# Patient Record
Sex: FEMALE | Race: WHITE | NOT HISPANIC OR LATINO | Employment: OTHER | URBAN - METROPOLITAN AREA
[De-identification: names, ages, dates, MRNs, and addresses within clinical notes are randomized per-mention and may not be internally consistent; named-entity substitution may affect disease eponyms.]

---

## 2020-12-10 ENCOUNTER — OFFICE VISIT (OUTPATIENT)
Dept: OBSTETRICS AND GYNECOLOGY | Facility: CLINIC | Age: 35
End: 2020-12-10

## 2020-12-10 VITALS
HEIGHT: 62 IN | DIASTOLIC BLOOD PRESSURE: 98 MMHG | WEIGHT: 229 LBS | SYSTOLIC BLOOD PRESSURE: 160 MMHG | TEMPERATURE: 97.5 F | BODY MASS INDEX: 42.14 KG/M2

## 2020-12-10 DIAGNOSIS — Z00.00 ANNUAL PHYSICAL EXAM: Primary | ICD-10-CM

## 2020-12-10 DIAGNOSIS — Z80.3 FAMILY HISTORY OF BREAST CANCER: ICD-10-CM

## 2020-12-10 PROCEDURE — 99395 PREV VISIT EST AGE 18-39: CPT | Performed by: NURSE PRACTITIONER

## 2020-12-10 RX ORDER — FLUOXETINE HYDROCHLORIDE 20 MG/5ML
LIQUID ORAL DAILY
COMMUNITY
End: 2020-12-14

## 2020-12-10 RX ORDER — LEVONORGESTREL AND ETHINYL ESTRADIOL 100-20(84)
KIT ORAL
COMMUNITY
End: 2020-12-10 | Stop reason: SDUPTHER

## 2020-12-10 RX ORDER — LEVONORGESTREL AND ETHINYL ESTRADIOL 100-20(84)
1 KIT ORAL DAILY
Qty: 91 TABLET | Refills: 3 | Status: SHIPPED | OUTPATIENT
Start: 2020-12-10 | End: 2021-12-07

## 2020-12-10 NOTE — PROGRESS NOTES
GYN Annual Exam     CC - Here for annual exam.        HPI  Berenice Vitale is a 34 y.o. female, No obstetric history on file., who presents for annual well woman exam. Patient's last menstrual period was 09/26/2020 (approximate)..  Periods are regular every 90 days.  Dysmenorrhea:mild, occurring first 1-2 days of flow.  Patient reports problems with: none.  There were no changes to her medical or surgical history since her last visit.. Partner Status: Marital Status: .  New Partners since last visit: no.  Desires STD Screening: no. Her mother was diagnosed with breast cancer last year and she is interested in genetic screening.  INITIAL B/P AT OFFICE /98, DENIES HEADACHES OR VISUAL CHANGES. REPEAT B/P AT END OF VISIT 144/82.    Additional OB/GYN History   Current contraception: contraceptive methods: OCP (estrogen/progesterone)  Desires to: continue contraception  Last Pap : 9/13/2018 negative  Last Completed Pap Smear       Status Date      PAP SMEAR No completions recorded        History of abnormal Pap smear: no  Family history of uterine, colon, breast, or ovarian cancer: yes - mother bilateral breast cancer 2019 age 70  Performs monthly Self-Breast Exam: yes  Exercises Regularly:yes  Feelings of Anxiety or Depression: yes - trwated by PCP  Tobacco Usage?: No   OB History    No obstetric history on file.         Health Maintenance   Topic Date Due   • Annual Gynecologic Pelvic and Breast Exam  1986   • ANNUAL PHYSICAL  12/30/1989   • TDAP/TD VACCINES (1 - Tdap) 12/30/2005   • INFLUENZA VACCINE  08/01/2020   • HEPATITIS C SCREENING  12/10/2020   • PAP SMEAR  12/10/2020   • Pneumococcal Vaccine 0-64  Aged Out       The additional following portions of the patient's history were reviewed and updated as appropriate: allergies, current medications, past family history, past medical history, past social history, past surgical history and problem list.    Review of Systems   Constitutional: Negative.   "  Gastrointestinal: Negative.    Genitourinary: Negative.    Psychiatric/Behavioral: Negative.    All other systems reviewed and are negative.    All other systems reviewed and are negative.     I have reviewed and agree with the HPI, ROS, and historical information as entered above. Cris Barrow, APRN    Objective   /98   Temp 97.5 °F (36.4 °C)   Ht 157.5 cm (62\")   Wt 104 kg (229 lb)   LMP 09/26/2020 (Approximate)   Breastfeeding No   BMI 41.88 kg/m²     Physical Exam  Vitals signs and nursing note reviewed. Exam conducted with a chaperone present.   Constitutional:       Appearance: Normal appearance. She is obese.   Cardiovascular:      Rate and Rhythm: Normal rate and regular rhythm.   Chest:      Breasts:         Right: Normal.         Left: Normal.   Genitourinary:     General: Normal vulva.      Vagina: Normal.      Cervix: Normal.      Uterus: Normal.       Adnexa: Right adnexa normal and left adnexa normal.      Rectum: Normal.   Neurological:      Mental Status: She is alert.            Assessment and Plan    Problem List Items Addressed This Visit     None      Visit Diagnoses     Annual physical exam    -  Primary    Relevant Orders    Pap IG, Rfx HPV ASCU          1. GYN annual well woman exam.   2. Reviewed monthly self breast exams.  Instructed to call with lumps, pain, or breast discharge.    3. Reviewed exercise as a preventative health measures.   4. Reccommended Flu Vaccine in Fall of each year.  5. RTC in 1 year or PRN with problems   6. Discussed hypertension today. Pt. States checks her B/P weekly at home and is always below 140/90. She will continue to monitor and if stays 140/90 or greater will need to see PCP.   7. Patient request to continue ocps. She understands is at increased risk for stroke if has uncontrolled hypertension. She will continue to monitor B/P at home weekly if plans to remain on ocps.       Cris Barrow, DONNA  12/10/2020  "

## 2020-12-14 ENCOUNTER — TELEPHONE (OUTPATIENT)
Dept: OBSTETRICS AND GYNECOLOGY | Facility: CLINIC | Age: 35
End: 2020-12-14

## 2020-12-14 RX ORDER — FLUOXETINE HYDROCHLORIDE 20 MG/1
20 CAPSULE ORAL DAILY
Qty: 90 CAPSULE | Refills: 3 | Status: SHIPPED | OUTPATIENT
Start: 2020-12-14 | End: 2021-12-07

## 2020-12-14 NOTE — TELEPHONE ENCOUNTER
Pt needs refill on her prozac 20 mg.  Pt. States she told us on her TOS, but prozac is not listed on her medication.

## 2020-12-14 NOTE — TELEPHONE ENCOUNTER
Patient was seen for her annual exam last week. Birth control pills were sent to the pharmacy but not her antidepressant. Please send in.       Pt. Has been on prozac 20mg daily.   Rx prozac refilled.

## 2020-12-17 DIAGNOSIS — Z00.00 ANNUAL PHYSICAL EXAM: ICD-10-CM

## 2020-12-28 ENCOUNTER — TELEPHONE (OUTPATIENT)
Dept: GENETICS | Facility: HOSPITAL | Age: 35
End: 2020-12-28

## 2021-04-15 ENCOUNTER — CLINICAL SUPPORT (OUTPATIENT)
Dept: GENETICS | Facility: HOSPITAL | Age: 36
End: 2021-04-15

## 2021-04-15 ENCOUNTER — LAB (OUTPATIENT)
Dept: LAB | Facility: HOSPITAL | Age: 36
End: 2021-04-15

## 2021-04-15 DIAGNOSIS — Z80.3 FAMILY HISTORY OF BREAST CANCER: ICD-10-CM

## 2021-04-15 DIAGNOSIS — Z13.79 GENETIC TESTING: Primary | ICD-10-CM

## 2021-04-15 DIAGNOSIS — Z80.0 FAMILY HISTORY OF COLON CANCER: ICD-10-CM

## 2021-04-15 PROCEDURE — 96040: CPT | Performed by: GENETIC COUNSELOR, MS

## 2021-04-16 NOTE — PROGRESS NOTES
Bereniec Vitale, a 35-year-old female, was seen for genetic counseling due to a family history of breast cancer. Ms. Vitale has no personal history of cancer. She was 13 years old at menarche and is nulliparous. She retains her uterus and ovaries. Her current cancer screening includes annual clinical breast exam. She was interested in discussing her risk for a hereditary cancer syndrome.  Ms. Vitale was interested in pursuing a multigene panel, and therefore the CancerNext panel was ordered through [employer] which analyzes 36 genes associated with an increased cancer risk. Results from this testing are expected in approximately 2-3 weeks.    FAMILY HISTORY (see attached pedigree):   Mother:   Breast cancer, 69  Mat. 1st cousin once removed:Breast cancer, 60s  Pat. Grandmother:  Breast cancer (left), 48; Breast cancer (right), 49; Colorectal cancer, 74  Pat. Grandfather:  Mesothelioma, 72    Medical records regarding the family history were not available for review.     RISK ASSESSMENT:  Ms. Vitale’s family history of breast cancer led to concern regarding a hereditary cancer syndrome.  She meets NCCN guidelines criteria for BRCA1/2 testing based on her paternal family history of contralateral breast cancer diagnosed before age 50. If genetic testing is negative, Ms. Vitale’s management should be guided by family history. These risk assessments are based on the family history information provided at the time of the appointment.      GENETIC COUNSELING: (30 minutes) We reviewed the family history information in detail.  Cases of cancer follow three general patterns: sporadic, familial, and hereditary.  While most cancer is sporadic, some cases appear to occur in family clusters.  These cases are said to be familial and account for 10-20% of certain cancer cases.  Familial cases may be due to a combination of shared genes and environmental factors among family members.  In even fewer families, the cancer is said to be  inherited, and the genes responsible for the cancer are known.      Family histories typical of hereditary cancer syndromes usually include multiple first- and second-degree relatives diagnosed with cancer types that define a syndrome.  These cases tend to be diagnosed at younger-than-expected ages and can be bilateral or multifocal.  The cancer in these families follows an autosomal dominant inheritance pattern, which indicates the likely presence of a mutation in a cancer susceptibility gene.  Children and siblings of an individual believed to carry this mutation have a 50% chance of inheriting that mutation, thereby inheriting the increased risk to develop cancer.  These mutations can be passed down from the maternal or the paternal lineage.    Based on Ms. Vitale’s family history, we discussed that hereditary breast cancer accounts for approximately 5-10% of all cases of breast cancer.  A significant proportion of hereditary breast and ovarian cancer can be attributed to mutations in the BRCA1 and BRCA2 genes.  Mutations in these genes confer an increased risk for breast cancer, ovarian cancer, male breast cancer, prostate cancer, and pancreatic cancer. Women with a BRCA1 or BRCA2 mutation have up to an 87% lifetime risk of breast cancer and up to a 44% risk of ovarian cancer.     There are other, more rare, hereditary cancer syndromes. Some of these conditions have well defined cancer risks and established management guidelines.  Other genes that can be tested for have been more recently described, and there may be less data regarding the risks and therefore may not have established management guidelines.  We discussed these limitations at length. Based on Ms. Vitale’s family history and her desire to get more information regarding her personal risks she opted to pursue testing through a panel evaluating several other genes known to increase the risk for cancer.    GENETIC TESTING:  The risks, benefits and  limitations of genetic testing and implications for clinical management following testing were reviewed. DNA test results can influence decisions regarding screening and prevention.  Genetic testing can have significant psychological implications for both individuals and families. Also discussed was the possibility of employment and insurance discrimination based on genetic test results and the federal and states laws that are in place to prevent this.         We discussed multigene panel testing, which would involve testing BRCA1/2 and an additional 34 genes associated with an increased cancer risk. The benefits and limitations of genetic testing were discussed and Ms. Vitale decided to pursue testing of the genes via the panel. The implications of a positive or negative test result were discussed.  We also discussed the importance of testing on an affected relative.  In cases where an affected relative is not available for testing or not willing to pursue testing, it is appropriate to offer testing to an unaffected individual. We discussed the possibility that, in some cases, genetic test results may be ambiguous due to the identification of a genetic variant. These variants may or may not be associated with an increased cancer risk. With multigene panel testing, it is not uncommon for a variant of uncertain significance (VUS) to be identified.  If a VUS is identified, testing family members is not recommended and screening recommendations are made based on the family history.  The laboratories that perform genetic testing work to reclassify the VUS and send out an amended report if and when a VUS is reclassified.  The majority of variant findings are ultimately reclassified to a negative result. Given her family history, a negative test result does not eliminate all cancer risk, although the risk would not be as high as it would with positive genetic testing.     PLAN:  Genetic testing via the Nexterra panel  through [employer] was ordered and results are expected in 2-3 weeks. We will contact Ms. Vitale with her results once they are received.      Sonya Buck MS, [facility], [facility]  Licensed Certified Genetic Counselor

## 2021-05-03 ENCOUNTER — DOCUMENTATION (OUTPATIENT)
Dept: GENETICS | Facility: HOSPITAL | Age: 36
End: 2021-05-03

## 2021-05-03 NOTE — PROGRESS NOTES
SUMMARY: Genetic testing was negative for known pathogenic (harmful) mutations. Lifetime breast cancer risk estimated to be 26.9% using family history-based risk model (Yelenaer-Keenanck, v8). Candidate for increased screening at age 39, including breast MRI.    Berenice Vitale, a 35-year-old female, was seen for genetic counseling due to a family history of breast cancer. Ms. Vitale has no personal history of cancer. She was 13 years old at menarche and is nulliparous. She retains her uterus and ovaries. Her current cancer screening includes annual clinical breast exam. She was interested in discussing her risk for a hereditary cancer syndrome.  Ms. Vitale was interested in pursuing a multigene panel, and therefore the CancerNext panel was ordered through [employer] which analyzes 36 genes associated with an increased cancer risk. The genes on this panel include APC, DEBRA, AXIN2, BARD1, BMPR1A, BRCA1, BRCA2, BRIP1, CDH1, CDK4, CDKN2A, CHEK2, DICER1, EPCAM, GREM1, HOXB13, MLH1, MSH2, MSH3, MSH6, MUTYH, NBN, NF1, NTHL1, PALB2, PMS2, POLD1, POLE, PTEN, RAD51C, RAD51D, RECQL, SMAD4, SMARCA4, STK11, and TP53.  Genetic testing was negative for known pathogenic mutations in BRCA1/2 and 34 additional genes on the CancerNext panel. While no known pathogenic mutations were identified, a variant of uncertain significance was identified in the MSH6 gene. Variants are changes in DNA that may or may not affect the function of the gene. The classification of a variant to either a benign gene change or pathogenic mutation depends on a number of factors. The majority (estimated to be >90%) of VUS’s are eventually reclassified as benign gene changes. It is not recommended that any unaffected relatives be tested for this variant at this time, and management should not be altered based on this variant.  Management should be guided by family history assessments. These results were discussed with Ms. Vitale by telephone on 5/3/2021.    FAMILY  HISTORY (see attached pedigree):   Mother:    Breast cancer, 69  Mat. 1st cousin once removed: Breast cancer, 60s  Pat. Grandmother:   Breast cancer (left), 48; Breast cancer (right), 49; Colorectal cancer, 74  Pat. Grandfather:   Mesothelioma, 72    Medical records regarding the family history were not available for review.     RISK ASSESSMENT:  Ms. Vitale’s family history of breast cancer led to concern regarding a hereditary cancer syndrome.  She meets NCCN guidelines criteria for BRCA1/2 testing based on her paternal family history of contralateral breast cancer diagnosed before age 50. If genetic testing is negative, Ms. Vitale’s management should be guided by family history.     At this time, Ms. Vitale’s management should be guided by a family history-based risk assessment. Boardvotezick, version 8 is able to take into account personal factors (age at menarche, parity, etc.) and family history when calculating risk for breast cancer. Computer modeling estimates that Ms. Vitale’s lifetime personal risk for developing breast cancer is up to 26.9% (Yelenaer-Jobzick, v8), which is elevated over the general population risk of 12.5%. A risk of 20% or greater warrants consideration of increased screening per NCCN guidelines.  This risk assessment is based on the family history information provided at the time of the appointment and could change in the future should new information be obtained.    GENETIC COUNSELING: We reviewed the family history information in detail.  Cases of cancer follow three general patterns: sporadic, familial, and hereditary.  While most cancer is sporadic, some cases appear to occur in family clusters.  These cases are said to be familial and account for 10-20% of certain cancer cases.  Familial cases may be due to a combination of shared genes and environmental factors among family members.  In even fewer families, the cancer is said to be inherited, and the genes responsible for the cancer are  known.      Family histories typical of hereditary cancer syndromes usually include multiple first- and second-degree relatives diagnosed with cancer types that define a syndrome.  These cases tend to be diagnosed at younger-than-expected ages and can be bilateral or multifocal.  The cancer in these families follows an autosomal dominant inheritance pattern, which indicates the likely presence of a mutation in a cancer susceptibility gene.  Children and siblings of an individual believed to carry this mutation have a 50% chance of inheriting that mutation, thereby inheriting the increased risk to develop cancer.  These mutations can be passed down from the maternal or the paternal lineage.    Based on Ms. Vitale’s family history, we discussed that hereditary breast cancer accounts for approximately 5-10% of all cases of breast cancer.  A significant proportion of hereditary breast and ovarian cancer can be attributed to mutations in the BRCA1 and BRCA2 genes.  Mutations in these genes confer an increased risk for breast cancer, ovarian cancer, male breast cancer, prostate cancer, and pancreatic cancer. Women with a BRCA1 or BRCA2 mutation have up to an 87% lifetime risk of breast cancer and up to a 44% risk of ovarian cancer.     There are other, more rare, hereditary cancer syndromes. Some of these conditions have well defined cancer risks and established management guidelines.  Other genes that can be tested for have been   more recently described, and there may be less data regarding the risks and therefore may not have established management guidelines.  We discussed these limitations at length. Based on Ms. Vitale’s family history and her desire to get more information regarding her personal risks she opted to pursue testing through a panel evaluating several other genes known to increase the risk for cancer.    GENETIC TESTING:  The risks, benefits and limitations of genetic testing and implications for clinical  management following testing were reviewed. DNA test results can influence decisions regarding screening and prevention.  Genetic testing can have significant psychological implications for both individuals and families. Also discussed was the possibility of employment and insurance discrimination based on genetic test results and the federal and states laws that are in place to prevent this.         We discussed multigene panel testing, which would involve testing BRCA1/2 and an additional 34 genes associated with an increased cancer risk. The benefits and limitations of genetic testing were discussed and Ms. Vitale decided to pursue testing of the genes via the panel. The implications of a positive or negative test result were discussed.  We also discussed the importance of testing on an affected relative.  In cases where an affected relative is not available for testing or not willing to pursue testing, it is appropriate to offer testing to an unaffected individual. We discussed the possibility that, in some cases, genetic test results may be ambiguous due to the identification of a genetic variant. These variants may or may not be associated with an increased cancer risk. With multigene panel testing, it is not uncommon for a variant of uncertain significance (VUS) to be identified.  If a VUS is identified, testing family members is not recommended and screening recommendations are made based on the family history.  The laboratories that perform genetic testing work to reclassify the VUS and send out an amended report if and when a VUS is reclassified.  The majority of variant findings are ultimately reclassified to a negative result. Given her family history, a negative test result does not eliminate all cancer risk, although the risk would not be as high as it would with positive genetic testing.     TEST RESULTS:  Genetic testing was negative for known pathogenic mutations by sequencing, rearrangement testing,  and RNA analysis for the 36 genes on this panel.  A variant of uncertain significance (VUS) was identified in the MSH6 gene, however the majority of VUS’s are ultimately reclassified as benign, therefore this result should not be used in making management decisions at this time. If this variant is ever reclassified a new report will be issued by the laboratory and released directly to the ordering physician, and our office. We are unable to determine why Ms. Vitale’s relatives have developed cancer at this time. It is possible that there is a mutation present in the family that Ms. Vitale did not happen to inherit. This assessment is based on the information provided at the time of the consultation.     CLINICAL MANAGEMENT GUIDELINES: Despite the essentially negative genetic test results, Ms. Vitale’s lifetime risk for breast cancer is estimated to be above 20% based on her family history. Options available to individuals with a high lifetime risk for breast cancer were discussed, including increased surveillance and chemoprevention.     Increased surveillance, based on NCCN guidelines, would consist of semi-annual clinical breast exam and monthly self-breast exam starting by age 18 and annual mammography starting at age 40, or 10 years prior to the earliest diagnosis in the family, whichever is earliest. According to an American Cancer Society expert panel and NCCN guidelines, annual breast MRI should be offered to women whose lifetime risk of breast cancer is 20-25 percent or more, typically beginning at the same age as mammography. Breast cancer chemoprevention is another option that can be considered. Studies have shown that Tamoxifen and Raloxifene can cut the risk of estrogen receptor positive breast cancer by 50% when taken by high-risk women over a 5-year period. There are risks and side effects associated with these medications; therefore, the risks versus benefits must be considered prior to deciding to take  chemopreventative medications. These recommendations are based on the information provided at time of consultation and could change should new information become available regarding Ms. Vitale’s personal and/or family history.    PLAN:  Genetic counseling remains available to Ms. Vitale. She plans to follow up with her gynecologist for coordination of increased breast screening.  Ms. Vitale is encouraged to call our office if she has any questions, concerns, or updates to the family history at [phone].       Sonya Buck MS, [facility], [facility]  Licensed Certified Genetic Counselor      Cc: DONNA Paul

## 2021-05-19 ENCOUNTER — OFFICE VISIT (OUTPATIENT)
Dept: FAMILY MEDICINE CLINIC | Facility: CLINIC | Age: 36
End: 2021-05-19

## 2021-05-19 VITALS
OXYGEN SATURATION: 99 % | RESPIRATION RATE: 18 BRPM | TEMPERATURE: 98.8 F | SYSTOLIC BLOOD PRESSURE: 192 MMHG | WEIGHT: 220 LBS | BODY MASS INDEX: 40.48 KG/M2 | DIASTOLIC BLOOD PRESSURE: 90 MMHG | HEART RATE: 104 BPM | HEIGHT: 62 IN

## 2021-05-19 DIAGNOSIS — R03.0 WHITE COAT SYNDROME WITHOUT DIAGNOSIS OF HYPERTENSION: ICD-10-CM

## 2021-05-19 DIAGNOSIS — R53.83 OTHER FATIGUE: Primary | ICD-10-CM

## 2021-05-19 DIAGNOSIS — Z11.59 NEED FOR HEPATITIS C SCREENING TEST: ICD-10-CM

## 2021-05-19 DIAGNOSIS — Z13.21 ENCOUNTER FOR VITAMIN DEFICIENCY SCREENING: ICD-10-CM

## 2021-05-19 PROCEDURE — 99203 OFFICE O/P NEW LOW 30 MIN: CPT | Performed by: FAMILY MEDICINE

## 2021-05-20 LAB
25(OH)D3+25(OH)D2 SERPL-MCNC: 29.2 NG/ML (ref 30–100)
ALBUMIN SERPL-MCNC: 4.6 G/DL (ref 3.8–4.8)
ALBUMIN/GLOB SERPL: 1.6 {RATIO} (ref 1.2–2.2)
ALP SERPL-CCNC: 59 IU/L (ref 48–121)
ALT SERPL-CCNC: 14 IU/L (ref 0–32)
AST SERPL-CCNC: 15 IU/L (ref 0–40)
BASOPHILS # BLD AUTO: 0 X10E3/UL (ref 0–0.2)
BASOPHILS NFR BLD AUTO: 1 %
BILIRUB SERPL-MCNC: 0.6 MG/DL (ref 0–1.2)
BUN SERPL-MCNC: 12 MG/DL (ref 6–20)
BUN/CREAT SERPL: 14 (ref 9–23)
CALCIUM SERPL-MCNC: 9.8 MG/DL (ref 8.7–10.2)
CHLORIDE SERPL-SCNC: 102 MMOL/L (ref 96–106)
CO2 SERPL-SCNC: 23 MMOL/L (ref 20–29)
CREAT SERPL-MCNC: 0.83 MG/DL (ref 0.57–1)
EOSINOPHIL # BLD AUTO: 0.1 X10E3/UL (ref 0–0.4)
EOSINOPHIL NFR BLD AUTO: 1 %
ERYTHROCYTE [DISTWIDTH] IN BLOOD BY AUTOMATED COUNT: 11.8 % (ref 11.7–15.4)
GLOBULIN SER CALC-MCNC: 2.9 G/DL (ref 1.5–4.5)
GLUCOSE SERPL-MCNC: 101 MG/DL (ref 65–99)
HCT VFR BLD AUTO: 42.6 % (ref 34–46.6)
HCV AB S/CO SERPL IA: <0.1 S/CO RATIO (ref 0–0.9)
HGB BLD-MCNC: 14.1 G/DL (ref 11.1–15.9)
IMM GRANULOCYTES # BLD AUTO: 0 X10E3/UL (ref 0–0.1)
IMM GRANULOCYTES NFR BLD AUTO: 0 %
LYMPHOCYTES # BLD AUTO: 2.8 X10E3/UL (ref 0.7–3.1)
LYMPHOCYTES NFR BLD AUTO: 36 %
MCH RBC QN AUTO: 28.6 PG (ref 26.6–33)
MCHC RBC AUTO-ENTMCNC: 33.1 G/DL (ref 31.5–35.7)
MCV RBC AUTO: 86 FL (ref 79–97)
MONOCYTES # BLD AUTO: 0.6 X10E3/UL (ref 0.1–0.9)
MONOCYTES NFR BLD AUTO: 8 %
NEUTROPHILS # BLD AUTO: 4.1 X10E3/UL (ref 1.4–7)
NEUTROPHILS NFR BLD AUTO: 54 %
PLATELET # BLD AUTO: 296 X10E3/UL (ref 150–450)
POTASSIUM SERPL-SCNC: 4 MMOL/L (ref 3.5–5.2)
PROT SERPL-MCNC: 7.5 G/DL (ref 6–8.5)
RBC # BLD AUTO: 4.93 X10E6/UL (ref 3.77–5.28)
SODIUM SERPL-SCNC: 140 MMOL/L (ref 134–144)
TSH SERPL DL<=0.005 MIU/L-ACNC: 2.11 UIU/ML (ref 0.45–4.5)
VIT B12 SERPL-MCNC: 499 PG/ML (ref 232–1245)
WBC # BLD AUTO: 7.7 X10E3/UL (ref 3.4–10.8)

## 2021-05-25 PROBLEM — F32.A DEPRESSION: Status: ACTIVE | Noted: 2021-05-25

## 2021-05-25 PROBLEM — R03.0 WHITE COAT SYNDROME WITHOUT DIAGNOSIS OF HYPERTENSION: Status: ACTIVE | Noted: 2021-05-25

## 2021-12-07 RX ORDER — FLUOXETINE HYDROCHLORIDE 20 MG/1
20 CAPSULE ORAL DAILY
Qty: 90 CAPSULE | Refills: 0 | Status: SHIPPED | OUTPATIENT
Start: 2021-12-07 | End: 2022-03-17

## 2021-12-07 RX ORDER — LEVONORGESTREL AND ETHINYL ESTRADIOL 100-20(84)
1 KIT ORAL DAILY
Qty: 91 TABLET | Refills: 0 | Status: SHIPPED | OUTPATIENT
Start: 2021-12-07 | End: 2022-03-17 | Stop reason: SDUPTHER

## 2022-03-17 ENCOUNTER — OFFICE VISIT (OUTPATIENT)
Dept: OBSTETRICS AND GYNECOLOGY | Facility: CLINIC | Age: 37
End: 2022-03-17

## 2022-03-17 VITALS — WEIGHT: 228 LBS | SYSTOLIC BLOOD PRESSURE: 120 MMHG | DIASTOLIC BLOOD PRESSURE: 80 MMHG | BODY MASS INDEX: 41.7 KG/M2

## 2022-03-17 DIAGNOSIS — Z30.41 ENCOUNTER FOR SURVEILLANCE OF CONTRACEPTIVE PILLS: ICD-10-CM

## 2022-03-17 DIAGNOSIS — E66.01 MORBID OBESITY WITH BMI OF 40.0-44.9, ADULT: ICD-10-CM

## 2022-03-17 DIAGNOSIS — Z01.419 ROUTINE GYNECOLOGICAL EXAMINATION: Primary | ICD-10-CM

## 2022-03-17 DIAGNOSIS — R45.89 DEPRESSED MOOD: ICD-10-CM

## 2022-03-17 PROCEDURE — 99214 OFFICE O/P EST MOD 30 MIN: CPT | Performed by: NURSE PRACTITIONER

## 2022-03-17 PROCEDURE — 99395 PREV VISIT EST AGE 18-39: CPT | Performed by: NURSE PRACTITIONER

## 2022-03-17 RX ORDER — LEVONORGESTREL AND ETHINYL ESTRADIOL 100-20(84)
1 KIT ORAL DAILY
Qty: 91 TABLET | Refills: 3 | Status: SHIPPED | OUTPATIENT
Start: 2022-03-17 | End: 2023-03-21 | Stop reason: SDUPTHER

## 2022-03-17 RX ORDER — BUPROPION HYDROCHLORIDE 150 MG/1
150 TABLET ORAL DAILY
Qty: 30 TABLET | Refills: 11 | Status: SHIPPED | OUTPATIENT
Start: 2022-03-17 | End: 2022-09-09 | Stop reason: SDUPTHER

## 2022-03-17 NOTE — PROGRESS NOTES
GYN Annual Exam     CC - Here for annual exam.        HPI  Berenice Vitale is a 36 y.o. female, , who presents for annual well woman exam.   Periods are every three months since she is on Seasonique ocps.  Dysmenorrhea:none.  There were no changes to her medical or surgical history since her last visit.. Partner Status: Marital Status: .  She is sexually active. She has not had new partners since her last STD testing. She does not desire STD testing. .  Patient would like to discuss depression meds today. She has been on Prozac for several years for depression and has gained 50 lbs. She exercises regularly and is frustrated that she is unable to lose weight. She had normal labs at PCP May 2021, except for borderline low vitamin D which she is currently taking now.   Additional OB/GYN History   Current contraception: contraceptive methods: OCP (estrogen/progesterone)  Desires to: continue contraception  Last Pap : 12/15/2020. Result: neg  Last Completed Pap Smear          Ordered - PAP SMEAR (Every 3 Years) Ordered on 3/17/2022    12/15/2020  Pap IG, Rfx HPV ASCU              History of abnormal Pap smear: no  Family history of uterine, colon, breast, or ovarian cancer: yes - mother and PGM breast  Performs monthly Self-Breast Exam: no  Exercises Regularly:yes  Feelings of Anxiety or Depression: yes - medication controlled  Tobacco Usage?: No   OB History    No obstetric history on file.         Health Maintenance   Topic Date Due   • Annual Gynecologic Pelvic and Breast Exam  Never done   • COVID-19 Vaccine (3 - Booster for Pfizer series) 2021   • ANNUAL PHYSICAL  2021   • PAP SMEAR  12/15/2023   • TDAP/TD VACCINES (2 - Td or Tdap) 2028   • HEPATITIS C SCREENING  Completed   • INFLUENZA VACCINE  Completed   • Pneumococcal Vaccine 0-64  Aged Out       The additional following portions of the patient's history were reviewed and updated as appropriate: allergies, current medications, past  family history, past medical history, past social history, past surgical history and problem list.    Review of Systems   Constitutional: Negative.    Cardiovascular: Negative.    Gastrointestinal: Negative.    Genitourinary: Negative.    Psychiatric/Behavioral: Positive for depressed mood.         I have reviewed and agree with the HPI, ROS, and historical information as entered above. Cris Barrow, APRN    Objective   /80   Wt 103 kg (228 lb)   BMI 41.70 kg/m²     Physical Exam  Vitals and nursing note reviewed. Exam conducted with a chaperone present.   Constitutional:       Appearance: Normal appearance. She is well-developed. She is obese.   HENT:      Head: Normocephalic and atraumatic.   Neck:      Thyroid: No thyroid mass or thyromegaly.   Cardiovascular:      Rate and Rhythm: Normal rate and regular rhythm.      Heart sounds: No murmur heard.  Pulmonary:      Effort: Pulmonary effort is normal. No retractions.      Breath sounds: Normal breath sounds. No wheezing, rhonchi or rales.   Chest:      Chest wall: No mass or tenderness.   Breasts:      Right: Normal. No mass, nipple discharge, skin change or tenderness.      Left: Normal. No mass, nipple discharge, skin change or tenderness.       Abdominal:      Palpations: Abdomen is soft. Abdomen is not rigid. There is no mass.      Tenderness: There is no abdominal tenderness. There is no guarding.      Hernia: No hernia is present.   Genitourinary:     General: Normal vulva.      Labia:         Right: No rash, tenderness or lesion.         Left: No rash, tenderness or lesion.       Vagina: Normal. No vaginal discharge or lesions.      Cervix: No cervical motion tenderness, discharge, lesion or cervical bleeding.      Uterus: Normal. Not enlarged, not fixed and not tender.       Adnexa: Right adnexa normal and left adnexa normal.        Right: No mass or tenderness.          Left: No mass or tenderness.        Rectum: No external hemorrhoid.    Musculoskeletal:      Cervical back: Normal range of motion. No muscular tenderness.   Neurological:      Mental Status: She is alert and oriented to person, place, and time.   Psychiatric:         Behavior: Behavior normal.            Assessment and Plan    Problem List Items Addressed This Visit    None     Visit Diagnoses     Routine gynecological examination    -  Primary    Relevant Orders    Pap IG, HPV-hr    Morbid obesity with BMI of 40.0-44.9, adult ([facility])        Encounter for surveillance of contraceptive pills        Depressed mood              1. GYN annual well woman exam.   2. Patient would like to change from Prozac to Wellbutrin for depression. She is concerned about how much weight she has gained over the last several years.  3. OCP's/Vaginal Ring - Discussed side effects of nausea, BTB, headaches, breast tenderness and slight weight gain in the first three cycles.  Understands risks of blood clots, stroke, and theoretical risk of breast cancer.  Denies family history of blood clots.  4. Reviewed monthly self breast exams.  Instructed to call with lumps, pain, or breast discharge.    5. Reviewed exercise as a preventative health measures.   6. Recommended use of Vitamin D replacement and getting adequate calcium in her diet. (1500mg)  7. Reccommended Flu Vaccine in Fall of each year.  8. RTC in 1 year or PRN with problems        Cris Barrow, APRN  03/17/2022

## 2022-03-24 DIAGNOSIS — Z01.419 ROUTINE GYNECOLOGICAL EXAMINATION: ICD-10-CM

## 2022-09-09 DIAGNOSIS — R45.89 DEPRESSED MOOD: ICD-10-CM

## 2022-09-09 RX ORDER — BUPROPION HYDROCHLORIDE 150 MG/1
150 TABLET ORAL DAILY
Qty: 90 TABLET | Refills: 0 | Status: SHIPPED | OUTPATIENT
Start: 2022-09-09 | End: 2022-12-06

## 2022-12-06 DIAGNOSIS — R45.89 DEPRESSED MOOD: ICD-10-CM

## 2022-12-06 RX ORDER — BUPROPION HYDROCHLORIDE 150 MG/1
TABLET ORAL
Qty: 30 TABLET | Refills: 2 | Status: SHIPPED | OUTPATIENT
Start: 2022-12-06 | End: 2023-03-01 | Stop reason: SDUPTHER

## 2023-03-01 DIAGNOSIS — R45.89 DEPRESSED MOOD: ICD-10-CM

## 2023-03-01 RX ORDER — BUPROPION HYDROCHLORIDE 150 MG/1
150 TABLET ORAL DAILY
Qty: 30 TABLET | Refills: 1 | Status: SHIPPED | OUTPATIENT
Start: 2023-03-01 | End: 2023-03-09 | Stop reason: SDUPTHER

## 2023-03-01 NOTE — TELEPHONE ENCOUNTER
RICO Barrow patient   Last seen 03/17/2022  No future appt    Received paper fax refill request for Bupropion  mg. Refill sent with one additional refill till patient makes f/u appt

## 2023-03-09 DIAGNOSIS — R45.89 DEPRESSED MOOD: ICD-10-CM

## 2023-03-09 RX ORDER — BUPROPION HYDROCHLORIDE 150 MG/1
150 TABLET ORAL DAILY
Qty: 30 TABLET | Refills: 0 | Status: SHIPPED | OUTPATIENT
Start: 2023-03-09 | End: 2023-03-21 | Stop reason: SDUPTHER

## 2023-03-09 NOTE — TELEPHONE ENCOUNTER
Pt said she has recently received an text from pharmacy since 3/1 stating that she needs to contact her provider for approval of refill for the medication that was supposed to have been sent on 3/1.

## 2023-03-09 NOTE — TELEPHONE ENCOUNTER
[facility]    Attempted to call patient about med refill request. States she does not have enough to make it to her appointment. Annual scheduled 3/21/23. Refill of medication sent in 3/1/23 for 30 day supply.

## 2023-03-21 ENCOUNTER — OFFICE VISIT (OUTPATIENT)
Dept: OBSTETRICS AND GYNECOLOGY | Facility: CLINIC | Age: 38
End: 2023-03-21
Payer: COMMERCIAL

## 2023-03-21 VITALS
DIASTOLIC BLOOD PRESSURE: 82 MMHG | BODY MASS INDEX: 43.28 KG/M2 | HEIGHT: 62 IN | WEIGHT: 235.2 LBS | SYSTOLIC BLOOD PRESSURE: 142 MMHG

## 2023-03-21 DIAGNOSIS — Z30.41 ENCOUNTER FOR SURVEILLANCE OF CONTRACEPTIVE PILLS: ICD-10-CM

## 2023-03-21 DIAGNOSIS — F41.9 ANXIETY: ICD-10-CM

## 2023-03-21 DIAGNOSIS — Z01.419 ROUTINE GYNECOLOGICAL EXAMINATION: Primary | ICD-10-CM

## 2023-03-21 DIAGNOSIS — R45.89 DEPRESSED MOOD: ICD-10-CM

## 2023-03-21 PROCEDURE — 99395 PREV VISIT EST AGE 18-39: CPT | Performed by: NURSE PRACTITIONER

## 2023-03-21 RX ORDER — BUPROPION HYDROCHLORIDE 150 MG/1
150 TABLET ORAL DAILY
Qty: 90 TABLET | Refills: 3 | Status: SHIPPED | OUTPATIENT
Start: 2023-03-21 | End: 2023-03-31 | Stop reason: SDUPTHER

## 2023-03-21 RX ORDER — LEVONORGESTREL AND ETHINYL ESTRADIOL 100-20(84)
1 KIT ORAL DAILY
Qty: 91 TABLET | Refills: 3 | Status: SHIPPED | OUTPATIENT
Start: 2023-03-21 | End: 2023-03-31 | Stop reason: SDUPTHER

## 2023-03-21 NOTE — PROGRESS NOTES
Gynecologic Annual Exam Note        Gynecologic Exam        Subjective     HPI  Berenice Vitale is a 37 y.o.  female who presents for annual well woman exam as a established patient. There were no changes to her medical or surgical history since her last visit.. Patient reports problems with: weight gain. Patient's last menstrual period was 2022 (within days).  Her periods are every three months secondary to birth control. She reports dysmenorrhea is none. Partner Status: Marital Status: .  She is sexually active. She has not had new partners.. STD testing recommendations have been explained to the patient and she does not desire STD testing.    Additional OB/GYN History   Current contraception: contraceptive methods: OCP (estrogen/progesterone)  Desires to: continue contraception  Thromboembolic Disease: none    History of STD: no    Last Pap : 3/17/22. Results: negative. HPV: negative.   Last Completed Pap Smear          PAP SMEAR (Every 3 Years) Next due on 3/22/2025    2022  Pap IG, HPV-hr    12/15/2020  Pap IG, Rfx HPV ASCU                 History of abnormal Pap smear: no  Gardasil status:completed  Family history of uterine, colon, breast, or ovarian cancer: yes - breast cancer-mother and maternal grandmother  Performs monthly Self-Breast Exam: yes  Exercises Regularly:yes  Feelings of Anxiety or Depression: yes - on medications that helps   Tobacco Usage?: No       Current Outpatient Medications:   •  buPROPion XL (WELLBUTRIN XL) 150 MG 24 hr tablet, Take 1 tablet by mouth Daily., Disp: 90 tablet, Rfl: 3  •  Levonorgest-Eth Estrad 91-Day 0.1-0.02 & 0.01 MG tablet, Take 1 tablet by mouth Daily. CALL FOR APPT.  ANNUAL DUE, Disp: 91 tablet, Rfl: 3     Patient is requesting refills of OCP, Wellbutrin 90 day supply if possible     OB History        0    Para   0    Term   0       0    AB   0    Living   0       SAB   0    IAB   0    Ectopic   0    Molar   0     "Multiple   0    Live Births   0                Health Maintenance   Topic Date Due   • COVID-19 Vaccine (3 - Booster for Pfizer series) 05/27/2021   • ANNUAL PHYSICAL  12/10/2021   • INFLUENZA VACCINE  08/01/2022   • Annual Gynecologic Pelvic and Breast Exam  03/18/2023   • PAP SMEAR  03/22/2025   • TDAP/TD VACCINES (2 - Td or Tdap) 01/01/2028   • HEPATITIS C SCREENING  Completed   • Pneumococcal Vaccine 0-64  Aged Out       Past Medical History:   Diagnosis Date   • Depression 2016   • Kidney stone 07/2011   • Migraine Unknown   • PONV (postoperative nausea and vomiting) 2018   • Varicella 1990        Past Surgical History:   Procedure Laterality Date   • ANKLE SURGERY     • WISDOM TOOTH EXTRACTION         The additional following portions of the patient's history were reviewed and updated as appropriate: allergies, current medications, past family history, past medical history, past social history, past surgical history and problem list.    Review of Systems   Constitutional: Positive for unexpected weight gain.   Cardiovascular: Negative.    Gastrointestinal: Negative.    Genitourinary: Negative.    Psychiatric/Behavioral: Negative.          I have reviewed and agree with the HPI, ROS, and historical information as entered above. Cris Barrow, APRN        Objective   /82   Ht 157.5 cm (62\")   Wt 107 kg (235 lb 3.2 oz)   LMP 12/25/2022 (Within Days)   BMI 43.02 kg/m²     Physical Exam  Vitals and nursing note reviewed. Exam conducted with a chaperone present.   Constitutional:       Appearance: She is well-developed.   HENT:      Head: Normocephalic and atraumatic.   Neck:      Thyroid: No thyroid mass or thyromegaly.   Cardiovascular:      Rate and Rhythm: Normal rate and regular rhythm.      Heart sounds: No murmur heard.  Pulmonary:      Effort: Pulmonary effort is normal. No retractions.      Breath sounds: Normal breath sounds. No wheezing, rhonchi or rales.   Chest:      Chest wall: No mass or " tenderness.   Breasts:     Right: Normal. No mass, nipple discharge, skin change or tenderness.      Left: Normal. No mass, nipple discharge, skin change or tenderness.   Abdominal:      Palpations: Abdomen is soft. Abdomen is not rigid. There is no mass.      Tenderness: There is no abdominal tenderness. There is no guarding.      Hernia: No hernia is present.   Genitourinary:     General: Normal vulva.      Labia:         Right: No rash, tenderness or lesion.         Left: No rash, tenderness or lesion.       Vagina: Normal. No vaginal discharge or lesions.      Cervix: No cervical motion tenderness, discharge, lesion or cervical bleeding.      Uterus: Normal. Not enlarged, not fixed and not tender.       Adnexa: Right adnexa normal and left adnexa normal.        Right: No mass or tenderness.          Left: No mass or tenderness.        Rectum: Normal. No external hemorrhoid.   Musculoskeletal:      Cervical back: Normal range of motion. No muscular tenderness.   Neurological:      Mental Status: She is alert and oriented to person, place, and time.   Psychiatric:         Behavior: Behavior normal.            Assessment and Plan    Problem List Items Addressed This Visit    None  Visit Diagnoses     Routine gynecological examination    -  Primary    Anxiety        Relevant Medications    buPROPion XL (WELLBUTRIN XL) 150 MG 24 hr tablet    Encounter for surveillance of contraceptive pills        Relevant Medications    Levonorgest-Eth Estrad 91-Day 0.1-0.02 & 0.01 MG tablet    Depressed mood        Relevant Medications    buPROPion XL (WELLBUTRIN XL) 150 MG 24 hr tablet            1. GYN annual well woman exam.   2. Doing well on current ocp. Rx refilled  3. Pt. Doing well on Wellbutrin for depression/anxiety. Request refill  4. Reviewed pap guidelines.   5. Reviewed monthly self breast exams.  Instructed to call with lumps, pain, or breast discharge.    6. Reviewed exercise as a preventative health measures.    7. Reccommended Flu Vaccine in Fall of each year.  8. RTC in 1 year or PRN with problems        Cris Barrow, APRN  03/21/2023

## 2023-03-31 DIAGNOSIS — Z30.41 ENCOUNTER FOR SURVEILLANCE OF CONTRACEPTIVE PILLS: ICD-10-CM

## 2023-03-31 DIAGNOSIS — R45.89 DEPRESSED MOOD: ICD-10-CM

## 2023-03-31 DIAGNOSIS — F41.9 ANXIETY: ICD-10-CM

## 2023-03-31 RX ORDER — LEVONORGESTREL AND ETHINYL ESTRADIOL 100-20(84)
1 KIT ORAL DAILY
Qty: 91 TABLET | Refills: 3 | Status: SHIPPED | OUTPATIENT
Start: 2023-03-31

## 2023-03-31 RX ORDER — BUPROPION HYDROCHLORIDE 150 MG/1
150 TABLET ORAL DAILY
Qty: 90 TABLET | Refills: 3 | Status: SHIPPED | OUTPATIENT
Start: 2023-03-31

## 2023-03-31 NOTE — TELEPHONE ENCOUNTER
RICO Barrow patient   Last seen 03/21/2023        Received refill request for 90 day supply from [facility].  90 day supply plus 3 additional sent.

## 2023-03-31 NOTE — TELEPHONE ENCOUNTER
Received 90 day refill request from [facility]. 90 day supply sent with 3 additional refills.

## 2024-03-22 DIAGNOSIS — F41.9 ANXIETY: ICD-10-CM

## 2024-03-22 DIAGNOSIS — R45.89 DEPRESSED MOOD: ICD-10-CM

## 2024-03-22 RX ORDER — BUPROPION HYDROCHLORIDE 150 MG/1
150 TABLET ORAL DAILY
Qty: 90 TABLET | Refills: 0 | Status: SHIPPED | OUTPATIENT
Start: 2024-03-22

## 2024-03-22 NOTE — TELEPHONE ENCOUNTER
Last annual 03/21/23  No future appt.    Received a refill request from [facility] for refill on Wellbutrin. Rx refilled with a 90 day supply to allow patient to make an appt. Patient needs an appt for further refills.

## 2024-03-27 DIAGNOSIS — Z30.41 ENCOUNTER FOR SURVEILLANCE OF CONTRACEPTIVE PILLS: ICD-10-CM

## 2024-03-27 DIAGNOSIS — R45.89 DEPRESSED MOOD: ICD-10-CM

## 2024-03-27 DIAGNOSIS — F41.9 ANXIETY: ICD-10-CM

## 2024-03-27 RX ORDER — LEVONORGESTREL AND ETHINYL ESTRADIOL 100-20(84)
1 KIT ORAL DAILY
Qty: 91 TABLET | Refills: 0 | Status: SHIPPED | OUTPATIENT
Start: 2024-03-27

## 2024-03-27 RX ORDER — LEVONORGESTREL AND ETHINYL ESTRADIOL 100-20(84)
1 KIT ORAL DAILY
Qty: 91 TABLET | Refills: 0 | Status: SHIPPED | OUTPATIENT
Start: 2024-03-27 | End: 2024-03-27 | Stop reason: SDUPTHER

## 2024-03-27 RX ORDER — BUPROPION HYDROCHLORIDE 150 MG/1
150 TABLET ORAL DAILY
Qty: 90 TABLET | Refills: 0 | Status: SHIPPED | OUTPATIENT
Start: 2024-03-27

## 2024-03-27 NOTE — TELEPHONE ENCOUNTER
Last annual 03/21/23  No future appt.    Received fax from [facility] for a refill of Levonorgest-Eth Estrad 91-Day 0.1-0.02 & 0.01 MG. Rx refilled with a 90 day supply and no refills to allow patient to make an appt. Patient needs a refill for further refills.

## 2024-06-10 DIAGNOSIS — R45.89 DEPRESSED MOOD: ICD-10-CM

## 2024-06-10 DIAGNOSIS — F41.9 ANXIETY: ICD-10-CM

## 2024-06-10 RX ORDER — BUPROPION HYDROCHLORIDE 150 MG/1
150 TABLET ORAL DAILY
Qty: 90 TABLET | Refills: 3 | OUTPATIENT
Start: 2024-06-10

## 2024-07-02 ENCOUNTER — OFFICE VISIT (OUTPATIENT)
Dept: OBSTETRICS AND GYNECOLOGY | Facility: CLINIC | Age: 39
End: 2024-07-02
Payer: COMMERCIAL

## 2024-07-02 VITALS
HEIGHT: 62 IN | DIASTOLIC BLOOD PRESSURE: 92 MMHG | WEIGHT: 227.8 LBS | BODY MASS INDEX: 41.92 KG/M2 | SYSTOLIC BLOOD PRESSURE: 146 MMHG

## 2024-07-02 DIAGNOSIS — R45.89 DEPRESSED MOOD: ICD-10-CM

## 2024-07-02 DIAGNOSIS — Z30.41 ENCOUNTER FOR SURVEILLANCE OF CONTRACEPTIVE PILLS: ICD-10-CM

## 2024-07-02 DIAGNOSIS — F41.9 ANXIETY: ICD-10-CM

## 2024-07-02 DIAGNOSIS — Z91.89 AT HIGH RISK FOR BREAST CANCER: Primary | ICD-10-CM

## 2024-07-02 DIAGNOSIS — Z01.419 WOMEN'S ANNUAL ROUTINE GYNECOLOGICAL EXAMINATION: ICD-10-CM

## 2024-07-02 RX ORDER — LEVONORGESTREL AND ETHINYL ESTRADIOL 100-20(84)
1 KIT ORAL DAILY
Qty: 91 TABLET | Refills: 4 | Status: SHIPPED | OUTPATIENT
Start: 2024-07-02

## 2024-07-02 RX ORDER — BUPROPION HYDROCHLORIDE 150 MG/1
150 TABLET ORAL DAILY
Qty: 90 TABLET | Refills: 4 | Status: SHIPPED | OUTPATIENT
Start: 2024-07-02

## 2024-07-02 NOTE — PROGRESS NOTES
Gynecologic Annual Exam Note        Gynecologic Exam        Subjective     HPI  Berenice Vitale is a 38 y.o.  female who presents for annual well woman exam as a established patient. There were no changes to her medical or surgical history since her last visit.. Patient's last menstrual period was 2024 (exact date). Her periods occur every 28 days, lasting 7 days.  The flow is very light. She denies dysmenorrhea. Marital Status: .  She is sexually active. She has not had new partners. STD testing recommendations have been explained to the patient and she does not desire STD testing.    The patient would like to discuss the following complaints today: None    Additional OB/GYN History   contraceptive methods: OCP (estrogen/progesterone)  Desires to: continue contraception  Thromboembolic Disease: none  History of migraines: no  Age of menarche: 13    History of STD: no    Last Pap : 2022. Results: negative. HPV: negative.   Last Completed Pap Smear            PAP SMEAR (Every 3 Years) Next due on 3/22/2025      2022  Pap IG, HPV-hr    12/15/2020  Pap IG, Rfx HPV ASCU                     History of abnormal Pap smear: no  Gardasil status:completed  Family history of uterine, colon, breast, or ovarian cancer: yes - Breast Cancer- PGM and Mother, Ovarian cancer-Sister  Performs monthly Self-Breast Exam: intermittently  Exercises Regularly:yes  Feelings of Anxiety or Depression: yes - managed with medication  Tobacco Usage?: No       Current Outpatient Medications:     buPROPion XL (WELLBUTRIN XL) 150 MG 24 hr tablet, Take 1 tablet by mouth Daily., Disp: 90 tablet, Rfl: 4    Levonorgest-Eth Estrad 91-Day 0.1-0.02 & 0.01 MG tablet, Take 1 tablet by mouth Daily., Disp: 91 tablet, Rfl: 4     Patient is requesting refills of Wellbutrin and Birth control.    OB History          0    Para   0    Term   0       0    AB   0    Living   0         SAB   0    IAB   0    Ectopic  "  0    Molar   0    Multiple   0    Live Births   0                Health Maintenance   Topic Date Due    ANNUAL PHYSICAL  12/10/2021    BMI FOLLOWUP  05/19/2022    Annual Gynecologic Pelvic and Breast Exam  03/22/2024    INFLUENZA VACCINE  08/01/2024    PAP SMEAR  03/22/2025    TDAP/TD VACCINES (2 - Td or Tdap) 01/01/2028    HEPATITIS C SCREENING  Completed    COVID-19 Vaccine  Completed    Pneumococcal Vaccine 0-64  Aged Out       Past Medical History:   Diagnosis Date    Depression 2016    Kidney stone 07/2011    Migraine Unknown    PONV (postoperative nausea and vomiting) 2018    Varicella 1990        Past Surgical History:   Procedure Laterality Date    ANKLE SURGERY      WISDOM TOOTH EXTRACTION         The additional following portions of the patient's history were reviewed and updated as appropriate: allergies, current medications, past family history, past medical history, past social history, past surgical history, and problem list.    Review of Systems   Constitutional: Negative.    Respiratory: Negative.     Cardiovascular: Negative.    Gastrointestinal: Negative.    Genitourinary: Negative.    Psychiatric/Behavioral: Negative.     All other systems reviewed and are negative.        I have reviewed and agree with the HPI, ROS, and historical information as entered above. Selam Bush, APRN          Objective   /92   Ht 157.5 cm (62.01\")   Wt 103 kg (227 lb 12.8 oz)   LMP 06/20/2024 (Exact Date)   BMI 41.65 kg/m²     Physical Exam  Constitutional:       Appearance: Normal appearance.   Neck:      Thyroid: No thyroid mass or thyromegaly.   Pulmonary:      Effort: Pulmonary effort is normal.   Chest:      Chest wall: No mass.   Breasts:     Right: Normal. No inverted nipple, mass, nipple discharge or skin change.      Left: Normal. No inverted nipple, mass, nipple discharge or skin change.   Abdominal:      General: There is no distension.      Palpations: Abdomen is soft. There is no mass.    "   Tenderness: There is no abdominal tenderness.      Hernia: No hernia is present.   Genitourinary:     General: Normal vulva.      Labia:         Right: No rash.         Left: No rash.       Vagina: Normal.      Cervix: No cervical motion tenderness or lesion.      Uterus: Normal.       Adnexa: Right adnexa normal and left adnexa normal.        Right: No mass or tenderness.          Left: No mass or tenderness.     Neurological:      Mental Status: She is alert.            Assessment and Plan    Problem List Items Addressed This Visit          Genitourinary and Reproductive     At high risk for breast cancer - Primary    Relevant Orders    Mammo Screening Digital Tomosynthesis Bilateral With CAD     Other Visit Diagnoses       Encounter for surveillance of contraceptive pills        Relevant Medications    Levonorgest-Eth Estrad 91-Day 0.1-0.02 & 0.01 MG tablet    Anxiety        Relevant Medications    buPROPion XL (WELLBUTRIN XL) 150 MG 24 hr tablet    Depressed mood        Relevant Medications    buPROPion XL (WELLBUTRIN XL) 150 MG 24 hr tablet    Women's annual routine gynecological examination              Happy on Wellbutrin, will refill today  She has known white coat HTN, BP mildly elevated today. She reports that at home her BP is 130's/80's and she is on no medication. Happy on BANDAR, taking as prescribed. She and her  are considering adoption.     GYN annual well woman exam.   Reviewed pap guidelines.   Reviewed risks/benefits of hormonal contraception after age 35, including possible increased risk of breast cancer, heart disease, blood clots and strokes.  Patient strongly desires to stay on hormonal contraception.  Reviewed monthly self breast exams.  Instructed to call with lumps, pain, or breast discharge.    Return in about 1 year (around 7/2/2025) for Annual physical.  Genetic counseling recommended to start mammograms age 39. Will order for 6 months out.     Selam Bush,  APRN  07/02/2024

## 2024-08-28 ENCOUNTER — OFFICE VISIT (OUTPATIENT)
Dept: FAMILY MEDICINE CLINIC | Facility: CLINIC | Age: 39
End: 2024-08-28
Payer: COMMERCIAL

## 2024-08-28 ENCOUNTER — HOSPITAL ENCOUNTER (OUTPATIENT)
Facility: HOSPITAL | Age: 39
Discharge: HOME | End: 2024-08-28
Admitting: PHYSICIAN ASSISTANT
Payer: COMMERCIAL

## 2024-08-28 VITALS
BODY MASS INDEX: 41.77 KG/M2 | DIASTOLIC BLOOD PRESSURE: 72 MMHG | WEIGHT: 227 LBS | OXYGEN SATURATION: 98 % | HEIGHT: 62 IN | TEMPERATURE: 98.4 F | SYSTOLIC BLOOD PRESSURE: 120 MMHG | HEART RATE: 109 BPM

## 2024-08-28 DIAGNOSIS — Z13.1 SCREENING FOR DIABETES MELLITUS: ICD-10-CM

## 2024-08-28 DIAGNOSIS — E04.1 LEFT THYROID NODULE: ICD-10-CM

## 2024-08-28 DIAGNOSIS — Z13.6 ENCOUNTER FOR LIPID SCREENING FOR CARDIOVASCULAR DISEASE: ICD-10-CM

## 2024-08-28 DIAGNOSIS — Z13.220 ENCOUNTER FOR LIPID SCREENING FOR CARDIOVASCULAR DISEASE: ICD-10-CM

## 2024-08-28 DIAGNOSIS — E55.9 VITAMIN D DEFICIENCY: ICD-10-CM

## 2024-08-28 DIAGNOSIS — F41.8 DEPRESSION WITH ANXIETY: ICD-10-CM

## 2024-08-28 DIAGNOSIS — Z13.29 SCREENING FOR THYROID DISORDER: ICD-10-CM

## 2024-08-28 DIAGNOSIS — Z00.00 ENCOUNTER FOR WELL ADULT EXAM WITHOUT ABNORMAL FINDINGS: Primary | ICD-10-CM

## 2024-08-28 PROCEDURE — 99385 PREV VISIT NEW AGE 18-39: CPT | Performed by: PHYSICIAN ASSISTANT

## 2024-08-28 PROCEDURE — 76536 US EXAM OF HEAD AND NECK: CPT

## 2024-08-28 RX ORDER — OMEPRAZOLE 40 MG/1
CAPSULE, DELAYED RELEASE ORAL
COMMUNITY
Start: 2024-08-26 | End: 2024-08-28

## 2024-08-28 NOTE — PROGRESS NOTES
Subjective   Berenice Vitale is a 38 y.o. female.   Chief Complaint   Patient presents with   • [facility]     Had a ct of neck in April, has concerns, Stated 6 mm low-density in the left thyroid. Was sent for Ct from Infectious disease and was told to F/u with Pcp     • Annual Exam      History of Present Illness   History of Present Illness  The patient is a 38-year-old female who presents to [facility] and for an annual physical.    She experienced strep throat in November 2023, which was treated with antibiotics at an urgent care facility. Despite completing the antibiotic course, her symptoms quickly returned, leading to three different cycles of antibiotics. An ENT specialist found no abnormalities but expressed concern about her acid reflux, referring her to a gastroenterologist. A culture test was conducted, but she did not receive the results. The gastroenterologist informed her that she tested positive for strep throat and referred her to an infectious disease specialist. Blood work and a scan were performed by the infectious disease specialist, revealing that she is now a carrier of the infection. She has had two episodes of strep throat in her life, both of which were difficult to treat. During her strep throat episode, she felt as though something was compressing her throat, but she does not feel this way currently. She still has her tonsils and reports no difficulty swallowing. CT scan from infectious disease showed a possible 6 mm left thyroid cyst in April. Pt was directed to follow up with PCP for additional evaluation. No known thyroid cancer runs in family     Her acid reflux symptoms have since subsided, and she is no longer taking medication for it. She took omeprazole for two months and continues to take it for three days during particularly stressful weeks.    She is currently taking Wellbutrin and vitamin D, which have significantly improved her mood and motivation. She used to sleep  "excessively during the day but has not undergone a sleep study. She does not snore and uses a mouthguard to prevent teeth grinding.    She monitors her blood pressure at home twice a week due to white coat hypertension. She is on birth control and reports no history of smoking.    She underwent genetic testing at [facility], which returned normal results. She reports no history of anemia and has good energy levels.    FAMILY HISTORY  Her sister has ovarian cancer and is scheduled for genetic testing in 11/2024. She denies any family history of thyroid cancer. Her father has an autoimmune condition     Pt works in IT with [employer]        The following portions of the patient's history were reviewed and updated as appropriate: allergies, current medications, past family history, past medical history, past social history, past surgical history, and problem list.    Review of Systems  As noted per HPI     Objective   Blood pressure 120/72, pulse 109, temperature 98.4 °F (36.9 °C), height 157.5 cm (62\"), weight 103 kg (227 lb), SpO2 98%, not currently breastfeeding. Body mass index is 41.52 kg/m².     Physical Exam  Vitals and nursing note reviewed.   Constitutional:       Appearance: Normal appearance. She is well-developed.   HENT:      Head: Normocephalic and atraumatic.      Right Ear: Tympanic membrane, ear canal and external ear normal.      Left Ear: Tympanic membrane, ear canal and external ear normal.      Nose: Nose normal.      Mouth/Throat:      Pharynx: No oropharyngeal exudate.   Eyes:      Conjunctiva/sclera: Conjunctivae normal.   Neck:      Thyroid: Thyromegaly present.      Trachea: No tracheal deviation.   Cardiovascular:      Rate and Rhythm: Normal rate and regular rhythm.      Heart sounds: Normal heart sounds.   Pulmonary:      Effort: Pulmonary effort is normal. No respiratory distress.      Breath sounds: Normal breath sounds. No wheezing or rales.   Chest:      Chest wall: No tenderness. "   Abdominal:      General: Bowel sounds are normal. There is no distension.      Palpations: Abdomen is soft. There is no mass.      Tenderness: There is no abdominal tenderness. There is no guarding or rebound.      Hernia: No hernia is present.   Musculoskeletal:      Cervical back: Normal range of motion and neck supple.   Lymphadenopathy:      Cervical: No cervical adenopathy.   Skin:     General: Skin is warm and dry.   Neurological:      Mental Status: She is alert and oriented to person, place, and time.   Psychiatric:         Mood and Affect: Mood normal.         Behavior: Behavior normal.         Thought Content: Thought content normal.         Judgment: Judgment normal.       Results  Imaging  Thyroid nodule detected on scan.    Assessment & Plan   Assessment & Plan  1. Annual Physical.  Her blood pressure readings are within the normal range today. A comprehensive set of tests will be conducted, including a complete blood count, metabolic panel, cholesterol panel, A1c, TSH, free T3, free T4, thyroid antibodies, vitamin D, magnesium, iron, B12, and folate. She is advised to continue her current birth control regimen and Wellbutrin.     2. Thyroid Nodule.  An incidental finding of a thyroid nodule was noted on CT scan of neck.  An ultrasound of the thyroid will be performedIf the ultrasound results are suspicious, a biopsy will be considered. She reports no family history of thyroid cancer and no symptoms of compression or enlargement.    3. Gastroesophageal Reflux Disease (GERD).  She has experienced acid reflux in the past, which she attributes to stress. She was previously on omeprazole for 2 months but now uses it occasionally during stressful weeks. Over-the-counter Pepcid is recommended as needed for quicker relief.    4. Strep Throat Carrier.  She has a history of recurrent strep throat and is now considered a carrier. She still has her tonsils, and if strep throat becomes recurrent, tonsillectomy  may be considered.    5. Increased Risk of Breast Cancer.  She has a family history of breast cancer and her sister was recently diagnosed with ovarian cancer. Genetic testing was previously done and came back fine. Her sister is scheduled for genetic testing in November.    6. Anxiety and Depression.  She is currently taking Wellbutrin and vitamin D, which have significantly improved her symptoms. She reports no current concerns and has not undergone a sleep study.       Diagnoses and all orders for this visit:    1. Encounter for well adult exam without abnormal findings (Primary)  -     CBC w AUTO Differential  -     Comprehensive metabolic panel  -     Lipid Panel  -     Hemoglobin A1c  -     TSH  -     T4, free  -     T3, free  -     Thyroid Antibodies  -     Vitamin D 25 hydroxy  -     Magnesium  -     Iron Profile  -     Vitamin B12  -     Folate    2. Encounter for lipid screening for cardiovascular disease  -     Lipid Panel    3. Screening for diabetes mellitus  -     Hemoglobin A1c    4. Screening for thyroid disorder  -     TSH  -     T4, free  -     T3, free  -     Thyroid Antibodies    5. Vitamin D deficiency  -     Vitamin D 25 hydroxy    6. Depression with anxiety  -     CBC w AUTO Differential  -     Comprehensive metabolic panel  -     TSH  -     T4, free  -     T3, free  -     Thyroid Antibodies  -     Magnesium  -     Iron Profile  -     Vitamin B12  -     Folate        Counseling was given to patient for the following topics: instructions for management, risk factor reductions, patient and family education, and impressions . Total time of the encounter was 20 minutes and 10 minutes was spent counseling.         Patient or patient representative verbalized consent for the use of Ambient Listening during the visit with  ARAM Peterson for chart documentation. 8/28/2024  11:08 EDT

## 2024-08-29 LAB
25(OH)D3+25(OH)D2 SERPL-MCNC: 37.2 NG/ML (ref 30–100)
ALBUMIN SERPL-MCNC: 4.4 G/DL (ref 3.9–4.9)
ALP SERPL-CCNC: 63 IU/L (ref 44–121)
ALT SERPL-CCNC: 19 IU/L (ref 0–32)
AST SERPL-CCNC: 15 IU/L (ref 0–40)
BASOPHILS # BLD AUTO: 0.1 X10E3/UL (ref 0–0.2)
BASOPHILS NFR BLD AUTO: 1 %
BILIRUB SERPL-MCNC: 0.6 MG/DL (ref 0–1.2)
BUN SERPL-MCNC: 14 MG/DL (ref 6–20)
BUN/CREAT SERPL: 15 (ref 9–23)
CALCIUM SERPL-MCNC: 9.3 MG/DL (ref 8.7–10.2)
CHLORIDE SERPL-SCNC: 104 MMOL/L (ref 96–106)
CHOLEST SERPL-MCNC: 216 MG/DL (ref 100–199)
CO2 SERPL-SCNC: 20 MMOL/L (ref 20–29)
CREAT SERPL-MCNC: 0.94 MG/DL (ref 0.57–1)
EGFRCR SERPLBLD CKD-EPI 2021: 80 ML/MIN/1.73
EOSINOPHIL # BLD AUTO: 0.1 X10E3/UL (ref 0–0.4)
EOSINOPHIL NFR BLD AUTO: 1 %
ERYTHROCYTE [DISTWIDTH] IN BLOOD BY AUTOMATED COUNT: 11.8 % (ref 11.7–15.4)
FOLATE SERPL-MCNC: >20 NG/ML
GLOBULIN SER CALC-MCNC: 3.2 G/DL (ref 1.5–4.5)
GLUCOSE SERPL-MCNC: 84 MG/DL (ref 70–99)
HBA1C MFR BLD: 5.2 % (ref 4.8–5.6)
HCT VFR BLD AUTO: 46.9 % (ref 34–46.6)
HDLC SERPL-MCNC: 49 MG/DL
HGB BLD-MCNC: 14.7 G/DL (ref 11.1–15.9)
IMM GRANULOCYTES # BLD AUTO: 0 X10E3/UL (ref 0–0.1)
IMM GRANULOCYTES NFR BLD AUTO: 0 %
IRON SATN MFR SERPL: 22 % (ref 15–55)
IRON SERPL-MCNC: 80 UG/DL (ref 27–159)
LDLC SERPL CALC-MCNC: 151 MG/DL (ref 0–99)
LYMPHOCYTES # BLD AUTO: 2.8 X10E3/UL (ref 0.7–3.1)
LYMPHOCYTES NFR BLD AUTO: 34 %
MAGNESIUM SERPL-MCNC: 2.1 MG/DL (ref 1.6–2.3)
MCH RBC QN AUTO: 28.4 PG (ref 26.6–33)
MCHC RBC AUTO-ENTMCNC: 31.3 G/DL (ref 31.5–35.7)
MCV RBC AUTO: 91 FL (ref 79–97)
MONOCYTES # BLD AUTO: 0.6 X10E3/UL (ref 0.1–0.9)
MONOCYTES NFR BLD AUTO: 8 %
NEUTROPHILS # BLD AUTO: 4.7 X10E3/UL (ref 1.4–7)
NEUTROPHILS NFR BLD AUTO: 56 %
PLATELET # BLD AUTO: 335 X10E3/UL (ref 150–450)
POTASSIUM SERPL-SCNC: 4.3 MMOL/L (ref 3.5–5.2)
PROT SERPL-MCNC: 7.6 G/DL (ref 6–8.5)
RBC # BLD AUTO: 5.18 X10E6/UL (ref 3.77–5.28)
SODIUM SERPL-SCNC: 138 MMOL/L (ref 134–144)
T3FREE SERPL-MCNC: 3.8 PG/ML (ref 2–4.4)
T4 FREE SERPL-MCNC: 1.11 NG/DL (ref 0.82–1.77)
THYROGLOB AB SERPL-ACNC: <1 IU/ML (ref 0–0.9)
THYROPEROXIDASE AB SERPL-ACNC: <9 IU/ML (ref 0–34)
TIBC SERPL-MCNC: 370 UG/DL (ref 250–450)
TRIGL SERPL-MCNC: 92 MG/DL (ref 0–149)
TSH SERPL DL<=0.005 MIU/L-ACNC: 2.11 UIU/ML (ref 0.45–4.5)
UIBC SERPL-MCNC: 290 UG/DL (ref 131–425)
VIT B12 SERPL-MCNC: 593 PG/ML (ref 232–1245)
VLDLC SERPL CALC-MCNC: 16 MG/DL (ref 5–40)
WBC # BLD AUTO: 8.3 X10E3/UL (ref 3.4–10.8)

## 2024-09-11 ENCOUNTER — TELEPHONE (OUTPATIENT)
Dept: FAMILY MEDICINE CLINIC | Facility: CLINIC | Age: 39
End: 2024-09-11
Payer: COMMERCIAL

## 2024-09-12 ENCOUNTER — TELEPHONE (OUTPATIENT)
Dept: FAMILY MEDICINE CLINIC | Facility: CLINIC | Age: 39
End: 2024-09-12
Payer: COMMERCIAL

## 2024-09-16 DIAGNOSIS — E78.00 HYPERCHOLESTEROLEMIA: Primary | ICD-10-CM

## 2024-09-16 RX ORDER — ROSUVASTATIN CALCIUM 10 MG/1
10 TABLET, COATED ORAL DAILY
Qty: 90 TABLET | Refills: 1 | Status: SHIPPED | OUTPATIENT
Start: 2024-09-16 | End: 2024-09-17

## 2024-09-17 DIAGNOSIS — E78.00 HYPERCHOLESTEROLEMIA: Primary | ICD-10-CM

## 2024-12-24 LAB
NCCN CRITERIA FLAG: ABNORMAL
TYRER CUZICK SCORE: 37.8

## 2024-12-26 DIAGNOSIS — Z91.89 AT HIGH RISK FOR BREAST CANCER: Primary | ICD-10-CM

## 2024-12-26 NOTE — PROGRESS NOTES
This patient recently took the CARE risk assessment as part of their mammogram appointment. Based on the responses, the patient meets NCCN criteria for genetic testing and the Tyrer-zi breast cancer risk score is > 20%.     Navigator follow-up:      The patient is interested in being referred to the [facility] High Risk Screening and Prevention Program, and a referral request will be submitted to her provider.  Patient should be contacted with 7-10 days to scheduled an appointment with the high-risk clinic. The patient had genetic counseling and genetic testing in 2021.  The CancerNext panel was ordered through [employer] which analyzes 36 genes associated with an increased cancer risk. Genetic testing was negative for known pathogenic mutations in BRCA1/2 and 34 additional genes on the CancerNext panel. No additional testing is indicated at this time.

## 2024-12-31 ENCOUNTER — HOSPITAL ENCOUNTER (OUTPATIENT)
Dept: MAMMOGRAPHY | Facility: HOSPITAL | Age: 39
Discharge: HOME | End: 2024-12-31
Admitting: NURSE PRACTITIONER
Payer: COMMERCIAL

## 2024-12-31 DIAGNOSIS — Z91.89 AT HIGH RISK FOR BREAST CANCER: ICD-10-CM

## 2024-12-31 PROCEDURE — 77067 SCR MAMMO BI INCL CAD: CPT

## 2024-12-31 PROCEDURE — 77063 BREAST TOMOSYNTHESIS BI: CPT

## 2025-01-02 PROCEDURE — 77063 BREAST TOMOSYNTHESIS BI: CPT | Performed by: RADIOLOGY

## 2025-01-02 PROCEDURE — 77067 SCR MAMMO BI INCL CAD: CPT | Performed by: RADIOLOGY

## 2025-01-08 ENCOUNTER — HOSPITAL ENCOUNTER (OUTPATIENT)
Facility: HOSPITAL | Age: 40
Discharge: HOME | End: 2025-01-08
Payer: COMMERCIAL

## 2025-01-08 ENCOUNTER — TRANSCRIBE ORDERS (OUTPATIENT)
Dept: MAMMOGRAPHY | Facility: HOSPITAL | Age: 40
End: 2025-01-08
Payer: COMMERCIAL

## 2025-01-08 DIAGNOSIS — R92.8 ABNORMAL MAMMOGRAM: ICD-10-CM

## 2025-01-08 DIAGNOSIS — R92.8 ABNORMAL MAMMOGRAM: Primary | ICD-10-CM

## 2025-01-08 PROCEDURE — 76642 ULTRASOUND BREAST LIMITED: CPT

## 2025-01-08 PROCEDURE — G0279 TOMOSYNTHESIS, MAMMO: HCPCS

## 2025-01-08 PROCEDURE — 77065 DX MAMMO INCL CAD UNI: CPT

## 2025-01-14 ENCOUNTER — HOSPITAL ENCOUNTER (OUTPATIENT)
Facility: HOSPITAL | Age: 40
Discharge: HOME | End: 2025-01-14
Payer: COMMERCIAL

## 2025-01-14 DIAGNOSIS — R92.8 ABNORMAL MAMMOGRAM: ICD-10-CM

## 2025-01-14 PROCEDURE — 25010000002 LIDOCAINE 1% - EPINEPHRINE 1:100000 1 %-1:100000 SOLUTION: Performed by: RADIOLOGY

## 2025-01-14 PROCEDURE — C1819 TISSUE LOCALIZATION-EXCISION: HCPCS

## 2025-01-14 PROCEDURE — 88305 TISSUE EXAM BY PATHOLOGIST: CPT | Performed by: NURSE PRACTITIONER

## 2025-01-14 PROCEDURE — 25010000002 LIDOCAINE 1 % SOLUTION: Performed by: RADIOLOGY

## 2025-01-14 RX ORDER — LIDOCAINE HYDROCHLORIDE AND EPINEPHRINE 10; 10 MG/ML; UG/ML
10 INJECTION, SOLUTION INFILTRATION; PERINEURAL ONCE
Status: COMPLETED | OUTPATIENT
Start: 2025-01-14 | End: 2025-01-14

## 2025-01-14 RX ORDER — LIDOCAINE HYDROCHLORIDE 10 MG/ML
10 INJECTION, SOLUTION INFILTRATION; PERINEURAL ONCE
Status: COMPLETED | OUTPATIENT
Start: 2025-01-14 | End: 2025-01-14

## 2025-01-14 RX ADMIN — LIDOCAINE HYDROCHLORIDE 20 ML: 10 INJECTION, SOLUTION INFILTRATION; PERINEURAL at 11:39

## 2025-01-14 RX ADMIN — LIDOCAINE HYDROCHLORIDE,EPINEPHRINE BITARTRATE 10 ML: 10; .01 INJECTION, SOLUTION INFILTRATION; PERINEURAL at 11:40

## 2025-01-14 NOTE — PROGRESS NOTES
Alert and oriented. Denies discomfort, no active bleeding, steri-strips not visualized, gauze dressing intact. Cold pack applied to breast over bandage. Questions answered, support given. Verbalizes and demonstrates understanding of post-care instructions, written copy given.

## 2025-01-16 ENCOUNTER — TRANSCRIBE ORDERS (OUTPATIENT)
Dept: ADMINISTRATIVE | Facility: HOSPITAL | Age: 40
End: 2025-01-16
Payer: COMMERCIAL

## 2025-01-16 ENCOUNTER — TELEPHONE (OUTPATIENT)
Facility: HOSPITAL | Age: 40
End: 2025-01-16
Payer: COMMERCIAL

## 2025-01-16 DIAGNOSIS — R92.8 ABNORMAL MAMMOGRAM: Primary | ICD-10-CM

## 2025-01-16 LAB
CYTO UR: NORMAL
LAB AP CASE REPORT: NORMAL
LAB AP CLINICAL INFORMATION: NORMAL
PATH REPORT.FINAL DX SPEC: NORMAL
PATH REPORT.GROSS SPEC: NORMAL

## 2025-03-12 ENCOUNTER — OFFICE VISIT (OUTPATIENT)
Dept: ONCOLOGY | Facility: CLINIC | Age: 40
End: 2025-03-12
Payer: COMMERCIAL

## 2025-03-12 VITALS
OXYGEN SATURATION: 99 % | DIASTOLIC BLOOD PRESSURE: 92 MMHG | TEMPERATURE: 97.5 F | WEIGHT: 225 LBS | SYSTOLIC BLOOD PRESSURE: 157 MMHG | RESPIRATION RATE: 16 BRPM | HEART RATE: 106 BPM | BODY MASS INDEX: 41.41 KG/M2 | HEIGHT: 62 IN

## 2025-03-12 DIAGNOSIS — Z91.89 AT HIGH RISK FOR BREAST CANCER: Primary | ICD-10-CM

## 2025-03-12 NOTE — PROGRESS NOTES
High Risk Oncology Clinic New Patient    Date of Encounter: 2025     Berenice Vitale  [id number]  1985    Referred By: Selam THOMPSON  PCP: Yaneth Savage PA  GYN:  Selam THOMPSON    Reason for Visit:  High Risk for Cancer    Problem List:  1. High Risk for Breast Cancer  A. Mammogram/US:     24 screening:  Scattered areas of fibroglandular density.  6 mm focal asymmetry in the retroareolar plane of right breast, 3 cm from the nipple.  BI-RADS 0.  25 right diagnostic/US:  Persistent lobulated isodense mass in the central /subareolar region measuring ~0.8 cm.  Associated faint calcification.  Presumed solid.  No sonographic correlate.    B. Breast MRI:   No history  C. Breast Biopsy/Surgery:   25 right breast 6:00 stereotactic biopsy:  Foci of fibrosis and reactive changes with sampling of apparent cyst wall suggestive of prior cyst rupture.  D.Tyrer-Cuzick Results:  37.8% lifetime risk of developing breast cancer  E. Genetic Testin [place] CancerNext panel:  No pathogenic mutation.  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 Last Pap Smear:  2024 negative  G. EGD/Colonoscopy/Cologuard:  No history  H.  Chest Radiation between 10 and 30 years of age: No history    History of Present Illness:     Berenice Vitale is a 39 y.o. year old female here today for initial consultation in the high risk oncology clinic. She was referred to our clinic due to an elevated Tyrer-Cuzick score of 37.8% on recent screening mammogram questionnaire.  She was previously seen by [facility] in  and underwent testing with the [place] CancerNext panel which was negative for pathogenic mutation.  She was found to have a VUS in the MSH6 gene.   At that time, her TC score was calculated to be 26.9%.  Since that time, her sister was diagnosed with ovarian cancer at age 33 and also noted to have a pathogenic mutation in the MSH3 gene (media).  Pt also  has a family history of breast cancer in her mother at 69, maternal 1st cousin once removed in her 60's and a paternal grandmother with breast cancer in her 40's.  She is performing monthly self breast exams.  Negative for nipple discharge, breast mass, dimpling, unilateral pain, asymmetry or other recent changes.  Underwent initial screening mammogram 24 and follow up right diagnostic mamm/US with concerning area in the central/subareolar region measuring ~0.8 cm.  She underwent right breast 6:00 stereotactic biopsy on 25 which showed changes consistent with prior cyst rupture.  Plans for follow up in 6 months with right diagnostic mammogram.      Pertinent Oncology Family History (Age of diagnosis):    Mother:                                                Breast cancer, 69  Sister:      Ovarian 33, +MSH3 pG896 gene mutation (media)  Mat. 1st cousin once removed:          Breast cancer, 60s  Pat. Grandmother:                              Breast cancer (left), 48; Breast cancer (right), 49; Colorectal cancer, 74  Pat. Grandfather:                                Mesothelioma, 72    Reproductive:    Age of first menstrual period:  13    Age of first live birth:  N/A  Pre/Shelia/Postmenopause:  Pre  Retains ovaries/uterus:  Retains  HRT use:  No history  Contraception use:  Current      Review of Systems   Constitutional: Negative for weight loss.   Cardiovascular:  Negative for chest pain.   Respiratory:  Negative for shortness of breath.    Hematologic/Lymphatic: Negative for adenopathy.   Skin:  Negative for suspicious lesions.   Neurological:  Negative for headaches.       Allergies:  Allergies   Allergen Reactions    Nickel Swelling         Current Outpatient Medications:     buPROPion XL (WELLBUTRIN XL) 150 MG 24 hr tablet, Take 1 tablet by mouth Daily., Disp: 90 tablet, Rfl: 4    Levonorgest-Eth Estrad -Day 0.1-0.02 & 0.01 MG tablet, Take 1 tablet by mouth Daily., Disp: 91 tablet, Rfl: 4    The  "current medication list and allergy list were reviewed and reconciled.     Social History     Socioeconomic History    Marital status:     Number of children: 0   Tobacco Use    Smoking status: Never    Smokeless tobacco: Never   Vaping Use    Vaping status: Never Used   Substance and Sexual Activity    Alcohol use: Yes     Comment: Maybe 3 drinks a month. 1 glass of wine or a mixed drink.    Drug use: Never    Sexual activity: Yes     Partners: Male     Birth control/protection: OCP       Past Medical History:   Diagnosis Date    BRCA1 negative     per pt    BRCA2 negative     per pt    Depression 2016    Kidney stone 07/2011    Migraine Unknown    PONV (postoperative nausea and vomiting) 2018    Varicella 1990       Past Surgical History:   Procedure Laterality Date    ANKLE SURGERY      BREAST BIOPSY Right 01/2025    WISDOM TOOTH EXTRACTION         Family History   Problem Relation Age of Onset    Breast cancer Mother 69    Hypertension Mother     Stroke Mother         Feb 2022    Ovarian cancer Sister 33        Diagnosis and removal of 1 in 2023    Other (MSH3) Sister     Breast cancer Paternal Grandmother 48    Colon cancer Paternal Grandmother 74    Mesothelioma Paternal Grandfather 72    Breast cancer Maternal Cousin         Onset Unknown       Past Medical History, Past Surgical History, Social History, Family History have been reviewed and are without significant changes except as mentioned.    Physical Exam:    /92   Pulse 106   Temp 97.5 °F (36.4 °C)   Resp 16   Ht 157.5 cm (62\")   Wt 102 kg (225 lb)   SpO2 99%   BMI 41.15 kg/m²   Pain Score    03/12/25 1303   PainSc: 0-No pain                    Physical Exam  Vitals and nursing note reviewed.   Constitutional:       Appearance: Normal appearance.   HENT:      Head: Normocephalic and atraumatic.   Cardiovascular:      Rate and Rhythm: Normal rate and regular rhythm.      Heart sounds: Normal heart sounds, S1 normal and S2 normal. No " murmur heard.  Pulmonary:      Effort: Pulmonary effort is normal.      Breath sounds: Normal breath sounds.   Chest:   Breasts:     Right: No swelling, inverted nipple, mass, nipple discharge, skin change or tenderness.      Left: No swelling, inverted nipple, mass, nipple discharge, skin change or tenderness.   Abdominal:      Palpations: Abdomen is soft.      Tenderness: There is no abdominal tenderness.   Musculoskeletal:         General: Normal range of motion.      Cervical back: Neck supple.      Right lower leg: No edema.      Left lower leg: No edema.   Lymphadenopathy:      Cervical: No cervical adenopathy.      Upper Body:      Right upper body: No axillary adenopathy.      Left upper body: No axillary adenopathy.   Skin:     General: Skin is warm and dry.   Neurological:      General: No focal deficit present.      Mental Status: She is alert and oriented to person, place, and time.   Psychiatric:         Mood and Affect: Mood normal.         Behavior: Behavior normal. Behavior is cooperative.         Thought Content: Thought content normal.         Judgment: Judgment normal.          Lab Results   Component Value Date    WBC 8.3 08/28/2024    HGB 14.7 08/28/2024    HCT 46.9 (H) 08/28/2024    MCV 91 08/28/2024     08/28/2024        Lab Results   Component Value Date    GLUCOSE 84 08/28/2024    BUN 14 08/28/2024    CREATININE 0.94 08/28/2024     08/28/2024    K 4.3 08/28/2024     08/28/2024    CALCIUM 9.3 08/28/2024    PROTEINTOT 7.6 08/28/2024    ALBUMIN 4.4 08/28/2024    ALT 19 08/28/2024    AST 15 08/28/2024    ALKPHOS 63 08/28/2024    BILITOT 0.6 08/28/2024    GLOB 3.2 08/28/2024    AGRATIO 1.6 05/19/2021    BCR 15 08/28/2024    EGFR 80 08/28/2024       Lab Results   Component Value Date    HGBA1C 5.2 08/28/2024       No results found.    Assessment and Plan:    Diagnoses and all orders for this visit:    1. At high risk for breast cancer (Primary)  -     Mammo Screening Digital  Tomosynthesis Bilateral With CAD; Future      -Continue monthly breast exams  -Right diagnostic mammogram 25  -Annual screening Breast MRI.  Discussed timing of procedure and possibly starting ~2025.  With upcoming diagnostic mammogram 2025 and repeat screening bilateral in Dec 2025, pt would like to start in 2026 to alternate with screening mammogram.  I have discussed that we can move this up sooner at anytime and she agrees to contact me to get this additional screening started.  I will follow up after her 2025 results to rediscuss timing as needed.  -Annual screening mammography Dec 2025.  Order placed  -Clinical breast exam every 6 months. Alternate between our clinic and OB-GYN  -Asked pt to contact our office if any new nipple discharge, mass, unilateral pain, dimpling, asymmetry or other recent breast changes  -Tyrer Cuzick score:  37.8% lifetime risk of developing breast cancer;  Average risk for women is ~13%.  -Genetic Testin [employer] CancerNext panel:  No pathogenic mutation.  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  She would like to consider, but wants to review information.  She and her  do not desire pregnancy and planning to adopt.  She will need to be discontinued off of her birth control and asked her to discuss with GYN about plans going forward.  Pt will let me know if she is interested in initiating.  -Educated pt and handout provided on NCCN recommendations related to diagnosis.  -Additional discussion and educational handouts provided including: self breast exam instructions, breast imaging modalities, Tyrer-Cuzick scoring system, risk reduction strategies/lifestyle modifications, genetic testing and NCCN high risk breast cancer     -Follow up 12 months    Thank you for allowing me to participate in the care of this patient     I spent 55  minutes caring for Berenice.  This includes time spent by me in the following activities:  preparing for visit, reviewing tests, obtaining history, performing physical exam, education, ordering necessary medications/testing and documenting in the medical record.     Felisha Mullins, APRN  03/12/25

## 2025-03-13 ENCOUNTER — PATIENT ROUNDING (BHMG ONLY) (OUTPATIENT)
Dept: ONCOLOGY | Facility: CLINIC | Age: 40
End: 2025-03-13
Payer: COMMERCIAL

## 2025-07-17 ENCOUNTER — HOSPITAL ENCOUNTER (OUTPATIENT)
Facility: HOSPITAL | Age: 40
Discharge: HOME | End: 2025-07-17
Admitting: RADIOLOGY
Payer: COMMERCIAL

## 2025-07-17 DIAGNOSIS — R92.8 ABNORMAL MAMMOGRAM: ICD-10-CM

## 2025-07-17 PROCEDURE — G0279 TOMOSYNTHESIS, MAMMO: HCPCS

## 2025-07-17 PROCEDURE — 77065 DX MAMMO INCL CAD UNI: CPT

## 2025-07-24 DIAGNOSIS — Z91.89 AT HIGH RISK FOR BREAST CANCER: Primary | ICD-10-CM
